# Patient Record
Sex: MALE | Race: WHITE | NOT HISPANIC OR LATINO | URBAN - METROPOLITAN AREA
[De-identification: names, ages, dates, MRNs, and addresses within clinical notes are randomized per-mention and may not be internally consistent; named-entity substitution may affect disease eponyms.]

---

## 2022-12-14 ENCOUNTER — APPOINTMENT (RX ONLY)
Dept: URBAN - METROPOLITAN AREA CLINIC 124 | Facility: CLINIC | Age: 77
Setting detail: DERMATOLOGY
End: 2022-12-14

## 2022-12-14 DIAGNOSIS — L29.89 OTHER PRURITUS: ICD-10-CM

## 2022-12-14 DIAGNOSIS — L82.1 OTHER SEBORRHEIC KERATOSIS: ICD-10-CM

## 2022-12-14 DIAGNOSIS — D49.2 NEOPLASM OF UNSPECIFIED BEHAVIOR OF BONE, SOFT TISSUE, AND SKIN: ICD-10-CM

## 2022-12-14 DIAGNOSIS — L21.8 OTHER SEBORRHEIC DERMATITIS: ICD-10-CM

## 2022-12-14 DIAGNOSIS — L57.0 ACTINIC KERATOSIS: ICD-10-CM

## 2022-12-14 PROBLEM — L29.8 OTHER PRURITUS: Status: ACTIVE | Noted: 2022-12-14

## 2022-12-14 PROCEDURE — 11103 TANGNTL BX SKIN EA SEP/ADDL: CPT

## 2022-12-14 PROCEDURE — ? BIOPSY BY SHAVE METHOD

## 2022-12-14 PROCEDURE — 11102 TANGNTL BX SKIN SINGLE LES: CPT

## 2022-12-14 PROCEDURE — ? COUNSELING

## 2022-12-14 PROCEDURE — 17000 DESTRUCT PREMALG LESION: CPT | Mod: 59

## 2022-12-14 PROCEDURE — 99203 OFFICE O/P NEW LOW 30 MIN: CPT | Mod: 25

## 2022-12-14 PROCEDURE — ? LIQUID NITROGEN

## 2022-12-14 ASSESSMENT — LOCATION DETAILED DESCRIPTION DERM
LOCATION DETAILED: LEFT INFERIOR LATERAL FOREHEAD
LOCATION DETAILED: LEFT ULNAR DORSAL HAND
LOCATION DETAILED: LEFT LATERAL UPPER BACK
LOCATION DETAILED: LEFT DISTAL POSTERIOR UPPER ARM
LOCATION DETAILED: RIGHT CENTRAL BUCCAL CHEEK
LOCATION DETAILED: RIGHT OCCIPITAL SCALP
LOCATION DETAILED: LEFT MEDIAL FOREHEAD
LOCATION DETAILED: LEFT ANTERIOR DISTAL THIGH

## 2022-12-14 ASSESSMENT — LOCATION ZONE DERM
LOCATION ZONE: HAND
LOCATION ZONE: LEG
LOCATION ZONE: SCALP
LOCATION ZONE: FACE
LOCATION ZONE: ARM
LOCATION ZONE: TRUNK

## 2022-12-14 ASSESSMENT — LOCATION SIMPLE DESCRIPTION DERM
LOCATION SIMPLE: POSTERIOR SCALP
LOCATION SIMPLE: LEFT THIGH
LOCATION SIMPLE: RIGHT CHEEK
LOCATION SIMPLE: LEFT UPPER ARM
LOCATION SIMPLE: LEFT FOREHEAD
LOCATION SIMPLE: LEFT HAND
LOCATION SIMPLE: LEFT UPPER BACK

## 2022-12-14 NOTE — PROCEDURE: BIOPSY BY SHAVE METHOD
Body Location Override (Optional - Billing Will Still Be Based On Selected Body Map Location If Applicable): left temporal scalp in front of ear
Detail Level: Simple
Depth Of Biopsy: dermis
Was A Bandage Applied: Yes
Size Of Lesion In Cm: 1
Biopsy Type: H and E
Biopsy Method: double edge Personna blade
Anesthesia Type: 1% lidocaine with epinephrine
Anesthesia Volume In Cc (Will Not Render If 0): 0.5
Additional Anesthesia Volume In Cc (Will Not Render If 0): 0
Hemostasis: Electrocautery
Wound Care: Petrolatum
Dressing: bandage
Destruction After The Procedure: No
Type Of Destruction Used: Curettage
Curettage Text: The wound bed was treated with curettage after the biopsy was performed.
Cryotherapy Text: The wound bed was treated with cryotherapy after the biopsy was performed.
Electrodesiccation Text: The wound bed was treated with electrodesiccation after the biopsy was performed.
Electrodesiccation And Curettage Text: The wound bed was treated with electrodesiccation and curettage after the biopsy was performed.
Silver Nitrate Text: The wound bed was treated with silver nitrate after the biopsy was performed.
Lab: [facility]
Lab Facility: [id number] [facility]
Consent: Written consent was obtained and risks were reviewed including but not limited to scarring, infection, bleeding, scabbing, incomplete removal, nerve damage and allergy to anesthesia.
Post-Care Instructions: I reviewed with the patient in detail post-care instructions. Patient is to keep the biopsy site dry overnight, and then apply bacitracin twice daily until healed. Patient may apply hydrogen peroxide soaks to remove any crusting.
Notification Instructions: Patient will be notified of biopsy results. However, patient instructed to call the office if not contacted within 2 weeks.
Billing Type: [employer]
Information: Selecting Yes will display possible errors in your note based on the variables you have selected. This validation is only offered as a suggestion for you. PLEASE NOTE THAT THE VALIDATION TEXT WILL BE REMOVED WHEN YOU FINALIZE YOUR NOTE. IF YOU WANT TO FAX A PRELIMINARY NOTE YOU WILL NEED TO TOGGLE THIS TO 'NO' IF YOU DO NOT WANT IT IN YOUR FAXED NOTE.
Body Location Override (Optional - Billing Will Still Be Based On Selected Body Map Location If Applicable): left upper arm
Size Of Lesion In Cm: 0.7
Hemostasis: Drysol
Lab: [id number]
Lab Facility: [id number]
Billing Type: Third-Party Bill

## 2022-12-14 NOTE — PROCEDURE: LIQUID NITROGEN
Duration Of Freeze Thaw-Cycle (Seconds): 0
Consent: The patient's consent was obtained including but not limited to risks of crusting, scabbing, blistering, scarring, darker or lighter pigmentary change, recurrence, incomplete removal and infection.
Render In Bullet Format When Appropriate: No
Detail Level: Zone
Total Number Of Aks Treated: 1
Post-Care Instructions: I reviewed with the patient in detail post-care instructions. Patient is to wear sunprotection, and avoid picking at any of the treated lesions. Pt may apply Vaseline to crusted or scabbing areas.

## 2022-12-16 ENCOUNTER — APPOINTMENT (RX ONLY)
Dept: URBAN - METROPOLITAN AREA CLINIC 124 | Facility: CLINIC | Age: 77
Setting detail: DERMATOLOGY
End: 2022-12-16

## 2022-12-16 DIAGNOSIS — Z48.817 ENCOUNTER FOR SURGICAL AFTERCARE FOLLOWING SURGERY ON THE SKIN AND SUBCUTANEOUS TISSUE: ICD-10-CM

## 2022-12-16 PROCEDURE — ? POST-OP WOUND CHECK

## 2022-12-16 PROCEDURE — 99024 POSTOP FOLLOW-UP VISIT: CPT

## 2022-12-16 ASSESSMENT — LOCATION ZONE DERM
LOCATION ZONE: FACE
LOCATION ZONE: ARM

## 2022-12-16 ASSESSMENT — LOCATION SIMPLE DESCRIPTION DERM
LOCATION SIMPLE: LEFT TEMPLE
LOCATION SIMPLE: LEFT UPPER ARM

## 2022-12-16 ASSESSMENT — LOCATION DETAILED DESCRIPTION DERM
LOCATION DETAILED: LEFT ANTERIOR PROXIMAL UPPER ARM
LOCATION DETAILED: LEFT LATERAL TEMPLE

## 2022-12-16 NOTE — PROCEDURE: POST-OP WOUND CHECK
Body Location Override (Optional - Billing Will Still Be Based On Selected Body Map Location If Applicable): Left upper arm
Detail Level: Detailed
Add 89101 Cpt? (Important Note: In 2017 The Use Of 58970 Is Being Tracked By Cms To Determine Future Global Period Reimbursement For Global Periods): yes
Additional Comments: Patient was instructed to keep pressure bandage on for 24 hr.
Add 58904 Cpt? (Important Note: In 2017 The Use Of 03674 Is Being Tracked By Cms To Determine Future Global Period Reimbursement For Global Periods): no

## 2022-12-29 ENCOUNTER — APPOINTMENT (RX ONLY)
Dept: URBAN - METROPOLITAN AREA CLINIC 124 | Facility: CLINIC | Age: 77
Setting detail: DERMATOLOGY
End: 2022-12-29

## 2022-12-29 PROBLEM — C44.619 BASAL CELL CARCINOMA OF SKIN OF LEFT UPPER LIMB, INCLUDING SHOULDER: Status: ACTIVE | Noted: 2022-12-29

## 2022-12-29 PROCEDURE — ? PATIENT SPECIFIC COUNSELING

## 2022-12-29 PROCEDURE — 17262 DSTRJ MAL LES T/A/L 1.1-2.0: CPT

## 2022-12-29 PROCEDURE — ? CURETTAGE AND DESTRUCTION

## 2022-12-29 NOTE — PROCEDURE: CURETTAGE AND DESTRUCTION
Body Location Override (Optional - Billing Will Still Be Based On Selected Body Map Location If Applicable): left upper arm
Detail Level: Detailed
Biopsy Photograph Reviewed: Yes
Accession # (Optional): [id number]
Number Of Curettages: 3
Size Of Lesion In Cm: 0.7
Size Of Lesion After Curettage: 1.5
Add Intralesional Injection: No
Total Volume (Ccs): 1
Anesthesia Type: 1% lidocaine with epinephrine
Cautery Type: electrodesiccation
What Was Performed First?: Curettage
Final Size Statement: The size of the lesion after curettage was
Additional Information: (Optional): The wound was cleaned, and a bandaid was applied. The patient received detailed post-op instructions.
Consent was obtained from the patient. The risks, benefits and alternatives to therapy were discussed in detail. Specifically, the risks of infection, scarring, bleeding, prolonged wound healing, nerve injury, incomplete removal, allergy to anesthesia and recurrence were addressed. Alternatives to [facility], such as: surgical removal and XRT were also discussed. Prior to the procedure, the treatment site was clearly identified and confirmed by the patient. All components of Universal Protocol/PAUSE Rule completed.
Post-Care Instructions: I reviewed with the patient in detail post-care instructions. Patient is to keep the area dry for 48 hours, and not to engage in any swimming until the area is healed. Should the patient develop any fevers, chills, bleeding, severe pain patient will contact the office immediately.
Bill As A Line Item Or As Units: Line Item

## 2022-12-29 NOTE — PROCEDURE: PATIENT SPECIFIC COUNSELING
Detail Level: Zone
During edc, pt was noted to bleed a lot. Pt denies being on anticoagulants. Postbiopsy, I suspect his bleeding came from having the site uncovered when in bed such that the scab gets rubbed off, bleeds, then has to scab again in the AM.  Pressure dressing to be left on for 48 hours. Counseled pt to make sure bandaid and vaseline cover edc site until fully healed to avoid a scab and subsequent bleeding that results from the scab getting knocked off. \\nMost recent physical exam in November showed normal labs per patient.